# Patient Record
Sex: FEMALE | Race: WHITE | NOT HISPANIC OR LATINO
[De-identification: names, ages, dates, MRNs, and addresses within clinical notes are randomized per-mention and may not be internally consistent; named-entity substitution may affect disease eponyms.]

---

## 2021-07-16 ENCOUNTER — APPOINTMENT (OUTPATIENT)
Dept: OPHTHALMOLOGY | Facility: CLINIC | Age: 81
End: 2021-07-16

## 2021-10-04 ENCOUNTER — APPOINTMENT (OUTPATIENT)
Dept: OPHTHALMOLOGY | Facility: CLINIC | Age: 81
End: 2021-10-04
Payer: MEDICARE

## 2021-10-04 ENCOUNTER — NON-APPOINTMENT (OUTPATIENT)
Age: 81
End: 2021-10-04

## 2021-10-04 PROCEDURE — 92014 COMPRE OPH EXAM EST PT 1/>: CPT

## 2021-10-04 PROCEDURE — 92134 CPTRZ OPH DX IMG PST SGM RTA: CPT

## 2022-02-14 ENCOUNTER — APPOINTMENT (OUTPATIENT)
Dept: OPHTHALMOLOGY | Facility: CLINIC | Age: 82
End: 2022-02-14

## 2022-04-07 PROBLEM — Z00.00 ENCOUNTER FOR PREVENTIVE HEALTH EXAMINATION: Status: ACTIVE | Noted: 2022-04-07

## 2022-04-28 ENCOUNTER — APPOINTMENT (OUTPATIENT)
Dept: ORTHOPEDIC SURGERY | Facility: HOSPITAL | Age: 82
End: 2022-04-28
Payer: MEDICARE

## 2022-04-28 PROCEDURE — 24435 RPR NON/MAL HUM WITH AGRFT: CPT | Mod: LT

## 2022-05-09 ENCOUNTER — APPOINTMENT (OUTPATIENT)
Dept: ORTHOPEDIC SURGERY | Facility: CLINIC | Age: 82
End: 2022-05-09
Payer: MEDICARE

## 2022-05-09 VITALS — BODY MASS INDEX: 27.6 KG/M2 | WEIGHT: 150 LBS | HEIGHT: 62 IN

## 2022-05-09 DIAGNOSIS — I10 ESSENTIAL (PRIMARY) HYPERTENSION: ICD-10-CM

## 2022-05-09 DIAGNOSIS — I51.9 HEART DISEASE, UNSPECIFIED: ICD-10-CM

## 2022-05-09 DIAGNOSIS — Z78.9 OTHER SPECIFIED HEALTH STATUS: ICD-10-CM

## 2022-05-09 DIAGNOSIS — Z86.39 PERSONAL HISTORY OF OTHER ENDOCRINE, NUTRITIONAL AND METABOLIC DISEASE: ICD-10-CM

## 2022-05-09 PROCEDURE — 73060 X-RAY EXAM OF HUMERUS: CPT

## 2022-05-09 PROCEDURE — 99024 POSTOP FOLLOW-UP VISIT: CPT

## 2022-05-09 RX ORDER — LOSARTAN POTASSIUM 100 MG/1
TABLET, FILM COATED ORAL
Refills: 0 | Status: ACTIVE | COMMUNITY

## 2022-05-09 RX ORDER — LEVOTHYROXINE SODIUM 0.17 MG/1
TABLET ORAL
Refills: 0 | Status: ACTIVE | COMMUNITY

## 2022-05-09 RX ORDER — AMLODIPINE BESYLATE 5 MG/1
TABLET ORAL
Refills: 0 | Status: ACTIVE | COMMUNITY

## 2022-05-09 RX ORDER — ATORVASTATIN CALCIUM 80 MG/1
TABLET, FILM COATED ORAL
Refills: 0 | Status: ACTIVE | COMMUNITY

## 2022-05-09 RX ORDER — METOPROLOL TARTRATE 75 MG/1
TABLET, FILM COATED ORAL
Refills: 0 | Status: ACTIVE | COMMUNITY

## 2022-05-15 NOTE — ASSESSMENT
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 Will remain strictly NWB. Will remain in sling. Elbow and wrist motion permitted.\par \par F/u 1 month; repeat radiographs of left humerus

## 2022-05-15 NOTE — HISTORY OF PRESENT ILLNESS
[de-identified] : Patient c/o pain the left upper arm. She denies prior shoulder/arm pain. She denies any paresthesias. She was seen at [facility] and xrays were taken. dx with humerus frx and told to f/u with ortho. SHe states when she moves her hand she notes movement of the fracture?\par \par 81F, RHD, PMHx of .. presents with left humeral fracture from 6/28/21. Patient reports bruising, swelling and pain is subsiding. Taking Oxycodone at night for pain/sleep. Starting to take motrin. Admits to elevating the arm.\par \par 3/23/22: f/u left humerus. Admits fracture did not heal right. Did see Dr. Thayer who advised f/u here again for surgery. Admits to deformity, does admit to pain intermittently. Denies numbness/tingling. limited use of left upper extremity. 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 Denies pain, Denies numbness/tingling. No constitutional symptoms.  Pain controlled.

## 2022-05-15 NOTE — IMAGING
[de-identified] : Left humerus and iliac crest incisions well healed - no erythema nor drainage. +motor/sensory in axillary, radial, median and ulanr. <2sec cap refill.\par \par Left humerus radiographs with maintenance of alignment. Proximal humerus plate in place.

## 2022-05-23 ENCOUNTER — APPOINTMENT (OUTPATIENT)
Dept: ORTHOPEDIC SURGERY | Facility: CLINIC | Age: 82
End: 2022-05-23
Payer: MEDICARE

## 2022-05-23 VITALS — WEIGHT: 150 LBS | HEIGHT: 62 IN | BODY MASS INDEX: 27.6 KG/M2

## 2022-05-23 PROCEDURE — 99024 POSTOP FOLLOW-UP VISIT: CPT

## 2022-05-24 NOTE — ASSESSMENT
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 Will remain strictly NWB. Will remain in sling. Elbow and wrist motion permitted. WBAT LLE.\par \par F/u 1 month; repeat radiographs of left humerus

## 2022-05-24 NOTE — IMAGING
[de-identified] : Left humerus and iliac crest incisions well healed - no erythema nor drainage. +motor/sensory in axillary, radial, median and ulnar. <2sec cap refill.\par \par Left humerus radiographs with maintenance of alignment. Proximal humerus plate in place.\par \par Left hip with improved pain since 1 week prior. Able to ambulate without aid of walker. Able to straight leg raise. +TA, EHL, [place]. SILT throughout.

## 2022-05-24 NOTE — HISTORY OF PRESENT ILLNESS
[de-identified] : Patient c/o pain the left upper arm. She denies prior shoulder/arm pain. She denies any paresthesias. She was seen at [facility] and xrays were taken. dx with humerus frx and told to f/u with ortho. SHe states when she moves her hand she notes movement of the fracture?\par \par 81F, RHD, PMHx of .. presents with left humeral fracture from 6/28/21. Patient reports bruising, swelling and pain is subsiding. Taking Oxycodone at night for pain/sleep. Starting to take motrin. Admits to elevating the arm.\par \par 3/23/22: f/u left humerus. Admits fracture did not heal right. Did see Dr. Thayer who advised f/u here again for surgery. Admits to deformity, does admit to pain intermittently. Denies numbness/tingling. limited use of left upper extremity. 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 Denies pain, Denies numbness/tingling. No constitutional symptoms.  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 Denies pain, Denies numbness/tingling. Feeling okay. Wearing sling. [facility] d/c 5/21/22. Patient reports needs at home PT - [facility] wont take case due to [place] having their hand in the OT/PT.

## 2022-06-20 ENCOUNTER — APPOINTMENT (OUTPATIENT)
Dept: ORTHOPEDIC SURGERY | Facility: CLINIC | Age: 82
End: 2022-06-20
Payer: MEDICARE

## 2022-06-20 PROCEDURE — 99024 POSTOP FOLLOW-UP VISIT: CPT

## 2022-06-20 PROCEDURE — 73060 X-RAY EXAM OF HUMERUS: CPT | Mod: LT

## 2022-06-25 NOTE — HISTORY OF PRESENT ILLNESS
[de-identified] : Patient c/o pain the left upper arm. She denies prior shoulder/arm pain. She denies any paresthesias. She was seen at [facility] and xrays were taken. dx with humerus frx and told to f/u with ortho. SHe states when she moves her hand she notes movement of the fracture?\par \par 81F, RHD, PMHx of .. presents with left humeral fracture from 6/28/21. Patient reports bruising, swelling and pain is subsiding. Taking Oxycodone at night for pain/sleep. Starting to take motrin. Admits to elevating the arm.\par \par 3/23/22: f/u left humerus. Admits fracture did not heal right. Did see Dr. Thayer who advised f/u here again for surgery. Admits to deformity, does admit to pain intermittently. Denies numbness/tingling. limited use of left upper extremity. 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 Denies pain, Denies numbness/tingling. No constitutional symptoms.  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 Denies pain, Denies numbness/tingling. Feeling okay. Wearing sling. [facility] d/c 5/21/22. Patient reports needs at home PT - [facility] wont take case due to [place] having their hand in the OT/PT. 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 Patient denies pain, denies numbness/tingling. No constitutional symptoms. Hip pain nonexistent.

## 2022-06-25 NOTE — ASSESSMENT
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 Will remain strictly NWB. Will remain in sling. OT - Elbow and wrist motion permitted. WBAT LLE. reviewed radiographs with patient, discussed no overt healing as of yet.\par \par F/u 2 month; repeat radiographs of left humerus

## 2022-06-25 NOTE — IMAGING
[de-identified] : Left humerus and iliac crest incisions well healed - no erythema nor drainage. +motor/sensory in axillary, radial, median and ulnar. <2sec cap refill.\par \par Left humerus radiographs with maintenance of alignment. Proximal humerus plate in place. No overt callus at fracture site.\par \par \par \par Left hip with no pain Able to ambulate without aid of walker. Able to straight leg raise. +TA, EHL, [place]. SILT throughout.

## 2022-06-29 ENCOUNTER — FORM ENCOUNTER (OUTPATIENT)
Age: 82
End: 2022-06-29

## 2022-08-08 ENCOUNTER — APPOINTMENT (OUTPATIENT)
Dept: ORTHOPEDIC SURGERY | Facility: CLINIC | Age: 82
End: 2022-08-08

## 2022-08-08 VITALS — HEIGHT: 62 IN | WEIGHT: 150 LBS | BODY MASS INDEX: 27.6 KG/M2

## 2022-08-08 PROCEDURE — 73060 X-RAY EXAM OF HUMERUS: CPT | Mod: LT

## 2022-08-08 PROCEDURE — 99213 OFFICE O/P EST LOW 20 MIN: CPT

## 2022-08-08 NOTE — ASSESSMENT
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 OT - ROM and strengthening. WBAT LLE. \par \par F/u 4 month; repeat radiographs of left humerus

## 2022-08-08 NOTE — IMAGING
[de-identified] : Left humerus and iliac crest incisions well healed - no erythema nor drainage. +motor/sensory in axillary, radial, median and ulnar. <2sec cap refill.\par \par Left humerus radiographs with maintenance of alignment. Proximal humerus plate in place. +callus at fracture site.\par \par \par \par Left hip with no pain Able to ambulate without aid of walker. Able to straight leg raise. +TA, EHL, [place]. SILT throughout.

## 2022-08-08 NOTE — HISTORY OF PRESENT ILLNESS
[de-identified] : Patient c/o pain the left upper arm. She denies prior shoulder/arm pain. She denies any paresthesias. She was seen at [facility] and xrays were taken. dx with humerus frx and told to f/u with ortho. SHe states when she moves her hand she notes movement of the fracture?\par \par 81F, RHD, PMHx of .. presents with left humeral fracture from 6/28/21. Patient reports bruising, swelling and pain is subsiding. Taking Oxycodone at night for pain/sleep. Starting to take motrin. Admits to elevating the arm.\par \par 3/23/22: f/u left humerus. Admits fracture did not heal right. Did see Dr. Thayer who advised f/u here again for surgery. Admits to deformity, does admit to pain intermittently. Denies numbness/tingling. limited use of left upper extremity. 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 Denies pain, Denies numbness/tingling. No constitutional symptoms.  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 Denies pain, Denies numbness/tingling. Feeling okay. Wearing sling. [facility] d/c 5/21/22. Patient reports needs at home PT - [facility] take case due to [place] having their hand in the OT/PT. 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 Patient denies pain, denies numbness/tingling. No constitutional symptoms. Hip pain nonexistent. 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 Admits to minimal pain - more stiffness than anything. Denies numbness/tingling. No constitutional symptoms. Going to OT for elbow - wrist - going well. Hip pain non existent.

## 2022-09-05 ENCOUNTER — FORM ENCOUNTER (OUTPATIENT)
Age: 82
End: 2022-09-05

## 2022-09-07 ENCOUNTER — FORM ENCOUNTER (OUTPATIENT)
Age: 82
End: 2022-09-07

## 2022-09-12 ENCOUNTER — FORM ENCOUNTER (OUTPATIENT)
Age: 82
End: 2022-09-12

## 2022-10-31 ENCOUNTER — NON-APPOINTMENT (OUTPATIENT)
Age: 82
End: 2022-10-31

## 2022-10-31 ENCOUNTER — APPOINTMENT (OUTPATIENT)
Dept: OPHTHALMOLOGY | Facility: CLINIC | Age: 82
End: 2022-10-31

## 2022-10-31 PROCEDURE — 92134 CPTRZ OPH DX IMG PST SGM RTA: CPT

## 2022-10-31 PROCEDURE — 92014 COMPRE OPH EXAM EST PT 1/>: CPT

## 2022-11-14 ENCOUNTER — APPOINTMENT (OUTPATIENT)
Dept: ORTHOPEDIC SURGERY | Facility: CLINIC | Age: 82
End: 2022-11-14

## 2022-11-14 DIAGNOSIS — S42.332K DISPLACED OBLIQUE FRACTURE OF SHAFT OF HUMERUS, LEFT ARM, SUBSEQUENT ENCOUNTER FOR FRACTURE WITH NONUNION: ICD-10-CM

## 2022-11-14 DIAGNOSIS — S39.012A STRAIN OF MUSCLE, FASCIA AND TENDON OF LOWER BACK, INITIAL ENCOUNTER: ICD-10-CM

## 2022-11-14 PROCEDURE — 99213 OFFICE O/P EST LOW 20 MIN: CPT

## 2022-11-14 PROCEDURE — 73060 X-RAY EXAM OF HUMERUS: CPT | Mod: LT

## 2022-11-15 PROBLEM — S42.332K: Status: ACTIVE | Noted: 2022-04-20

## 2022-11-15 NOTE — HISTORY OF PRESENT ILLNESS
[de-identified] : Patient c/o pain the left upper arm. She denies prior shoulder/arm pain. She denies any paresthesias. She was seen at [facility] and xrays were taken. dx with humerus frx and told to f/u with ortho. SHe states when she moves her hand she notes movement of the fracture?\par \par 81F, RHD, PMHx of .. presents with left humeral fracture from 6/28/21. Patient reports bruising, swelling and pain is subsiding. Taking Oxycodone at night for pain/sleep. Starting to take motrin. Admits to elevating the arm.\par \par 3/23/22: f/u left humerus. Admits fracture did not heal right. Did see Dr. Thayer who advised f/u here again for surgery. Admits to deformity, does admit to pain intermittently. Denies numbness/tingling. limited use of left upper extremity. 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 Denies pain, Denies numbness/tingling. No constitutional symptoms.  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 Denies pain, Denies numbness/tingling. Feeling okay. Wearing sling. [facility] d/c 5/21/22. Patient reports needs at home PT - [facility] wont take case due to [place] having their hand in the OT/PT. 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 Patient denies pain, denies numbness/tingling. No constitutional symptoms. Hip pain nonexistent. 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 Admits to minimal pain - more stiffness than anything. Denies numbness/tingling. No constitutional symptoms. Going to OT for elbow - wrist - going well. Hip pain non existent. 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 Denies pain, OT is finished, went well. Denies numbness/tingling.

## 2022-11-15 NOTE — ASSESSMENT
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 OT - ROM and strengthening/WBAT. WBAT LLE. \par \par F/u 6-12 month; repeat radiographs of left humerus

## 2022-11-15 NOTE — IMAGING
[de-identified] : Left humerus and iliac crest incisions well healed - no erythema nor drainage. +motor/sensory in axillary, radial, median and ulnar. <2sec cap refill.\par \par Left humerus radiographs with maintenance of alignment. Proximal humerus plate in place. Appears healed on AP, hardware alignment maintained, on oblique, medial fracture appears without callus.\par \par \par \par Left hip with no pain Able to ambulate without aid of walker. Able to straight leg raise. +TA, EHL, [place]. SILT throughout.

## 2022-11-20 ENCOUNTER — FORM ENCOUNTER (OUTPATIENT)
Age: 82
End: 2022-11-20

## 2023-01-05 ENCOUNTER — FORM ENCOUNTER (OUTPATIENT)
Age: 83
End: 2023-01-05

## 2023-05-01 ENCOUNTER — APPOINTMENT (OUTPATIENT)
Dept: OPHTHALMOLOGY | Facility: CLINIC | Age: 83
End: 2023-05-01
Payer: MEDICARE

## 2023-05-01 ENCOUNTER — NON-APPOINTMENT (OUTPATIENT)
Age: 83
End: 2023-05-01

## 2023-05-01 PROCEDURE — 92134 CPTRZ OPH DX IMG PST SGM RTA: CPT

## 2023-05-01 PROCEDURE — 92014 COMPRE OPH EXAM EST PT 1/>: CPT

## 2024-03-18 ENCOUNTER — APPOINTMENT (OUTPATIENT)
Dept: OPHTHALMOLOGY | Facility: CLINIC | Age: 84
End: 2024-03-18

## 2024-07-01 ENCOUNTER — APPOINTMENT (OUTPATIENT)
Dept: OPHTHALMOLOGY | Facility: CLINIC | Age: 84
End: 2024-07-01

## 2024-09-14 ENCOUNTER — NON-APPOINTMENT (OUTPATIENT)
Age: 84
End: 2024-09-14

## 2024-11-13 ENCOUNTER — APPOINTMENT (OUTPATIENT)
Dept: OPHTHALMOLOGY | Facility: CLINIC | Age: 84
End: 2024-11-13
Payer: MEDICARE

## 2024-11-13 ENCOUNTER — NON-APPOINTMENT (OUTPATIENT)
Age: 84
End: 2024-11-13

## 2024-11-13 PROCEDURE — 92014 COMPRE OPH EXAM EST PT 1/>: CPT

## 2024-11-13 PROCEDURE — 92134 CPTRZ OPH DX IMG PST SGM RTA: CPT

## 2025-05-12 ENCOUNTER — APPOINTMENT (OUTPATIENT)
Dept: OPHTHALMOLOGY | Facility: CLINIC | Age: 85
End: 2025-05-12
Payer: MEDICARE

## 2025-05-12 ENCOUNTER — NON-APPOINTMENT (OUTPATIENT)
Age: 85
End: 2025-05-12

## 2025-05-12 PROCEDURE — 92134 CPTRZ OPH DX IMG PST SGM RTA: CPT

## 2025-05-12 PROCEDURE — 92014 COMPRE OPH EXAM EST PT 1/>: CPT
